# Patient Record
(demographics unavailable — no encounter records)

---

## 2024-12-04 NOTE — DATA REVIEWED
[de-identified] : 12/4/24: XR Pelvis AP view obtained and independently reviewed in our office today: slight asymmetry of femoral ossification centers. R AI 20 degrees, L AI 23 degrees. Hips appear well located.   2/21/24: XR Pelvis AP view obtained and independently reviewed in our office today: slight asymmetry of femoral ossification centers. R AI 25.8 degrees, L AI 24.4 degrees. Hips appear well located.   BL hip US done on 8/29/23 at [facility] PACS were viewed and independently interpreted: 69 degrees, there is appropriate coverage of the right femoral head, left alpha angle measures 67 degrees, there is appropriate coverage of the left femoral head.  Bilateral hip ultrasound done on July 18, 2023 at [facility] were viewed and independently interpreted: Right femoral head coverage greater than 50%, alpha angle measures 72 degrees, left femoral head coverage greater than 50%, alpha angle measures 64 degrees  Ultrasounds of the hips done on 6/27/2023 at [facility] were viewed and independently interpreted:  Right alpha angle measures 68 degrees.  There is appropriate femoral head coverage. Left alpha angle measures 51 degrees.  However there appears to be the increased femoral head coverage as compared to previous ultrasound which is normal.  US of the hips obtained on 6/6/23 at [facility] was independently reviewed in our office today: "The Right alpha angle is normal measuring 64 degrees. There is mature appearance of the right acetabulum. There is appropriate coverage of the Right femoral head. The left acetabulum is shallow with an alpha angle of 52 degrees. Otherwise there is mature appearance of the left acetabulum with a sharp superolateral margin. There is appropriate coverage of the Left femoral head."

## 2024-12-04 NOTE — BIRTH HISTORY
[Duration: ___ wks] : duration: [unfilled] weeks [] :  [___ lbs.] : [unfilled] lbs [___ oz.] : [unfilled] oz. [Was child in NICU?] : Child was not in NICU [FreeTextEntry6] : anhydramnios

## 2024-12-04 NOTE — HISTORY OF PRESENT ILLNESS
[FreeTextEntry1] : Eliza is a 20 month old female who presents today with her parents for f/u evaluation of her hips.   At the 36 week ultrasound she was noted to be breech presentation. She is the first born female in the family. She was born at 36 weeks via CS.  Mother states that there was no hip click noted on exam by the pediatrician initially, however they wanted her to follow-up with orthopedics due to her history. She was evaluated in our office initially on 4/5/23. At that time, she had stable hips on exam, and we recommended a hip US to be performed at age 6 weeks, adjusted.  An ultrasound was done on 6/6/2023 at 6 weeks adjusted age.  It demonstrated a normal right hip, with mild dysplasia of the left hip.  Recommendation was to begin full-time Zeeshan harness wear 23 hours a day on June 7, 2023.  She returned to the office on June 28, 2023, repeat ultrasound at that time demonstrated improvement in femoral head coverage but did not show any improvement in the alpha angle on the left.  Recommendation was to continue with full-time brace wear. Ultrasound was done on July 18, 2023 showed normalization of the hips.  Recommendation was to transition to part-time bracing.  She returns today after part-time bracing for the last 6 weeks.  Repeat ultrasound was done on August 29, 2023, which showed normalization with no hip dysplasia. She completed 6 weeks full time bracing, and 6 weeks part time bracing. At visit on 8/30/23, bracing was discontinued. She is progressing with milestones, and started independently walking around 10 months old.   Today, patient is doing well. She is walking and running, no obvious limping. No obvious signs of pain or discomfort. No new concerns. Here for follow up evaluation and repeat XRs.

## 2024-12-04 NOTE — END OF VISIT
[FreeTextEntry3] : A physician assistant/resident assisted with documenting the visit and acted as a scribe. I have seen and examined the patient, made my assessment and plan and have made all modifications necessary to the note.  Raina Solis MD Pediatric Orthopaedics Surgery [facility]

## 2024-12-04 NOTE — ASSESSMENT
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  Eliza has completed 6 weeks of full-time bracing and 6 weeks of part-time bracing with the brain harness, and has been without a brace since August 2023. She is doing well clinically, with no symptoms, and progressing well in her milestones. XRs today reveal normal bilateral Acetabular indices. No orthopedic interventions deemed necessary. Observation only is indicated. No restrictions. FU around age 3 years old, at which time we will obtain new XR Pelvis AP only.   Today's visit included obtaining the history from the parent, due to the child's age, the child could not be considered a reliable historian, requiring the parent to act as an independent historian. The condition, natural history, and prognosis were explained to the family. The clinical findings and images were reviewed with the family. All questions answered. Family expressed understanding and agreement with the above.  I, Della Stern PA-C, acted as scribe and documented the above for Dr. Solis.

## 2024-12-04 NOTE — REASON FOR VISIT
[Follow Up] : a follow up visit [Parents] : parents [FreeTextEntry1] : Left hip dysplasia, history of breech presentation

## 2024-12-04 NOTE — PHYSICAL EXAM
[FreeTextEntry1] : General: healthy appearing, acting appropriate for age.  HEENT: NCAT, Normal conjunctiva Cardio: Appears well perfused, no peripheral edema, brisk cap refill.  Lungs: no obvious increased WOB, no audible wheeze heard without use of stethoscope.  Abdomen: not examined.  Skin: No visible rashes on exposed skin  MUSCULOSKELETAL:   BLE:  Skin intact Stable hips Negative ortolani, negative valle, negative galeazzi Wide symmetric abduction to 85 degrees Full ROM of hips/knees/ankles No foot deformity appreciate Toes WWP